# Patient Record
Sex: MALE | Race: WHITE | NOT HISPANIC OR LATINO | Employment: OTHER | URBAN - METROPOLITAN AREA
[De-identification: names, ages, dates, MRNs, and addresses within clinical notes are randomized per-mention and may not be internally consistent; named-entity substitution may affect disease eponyms.]

---

## 2017-01-04 RX ORDER — LISINOPRIL 10 MG/1
TABLET ORAL
Qty: 30 TABLET | Refills: 5 | Status: SHIPPED | OUTPATIENT
Start: 2017-01-04 | End: 2017-08-04 | Stop reason: SDUPTHER

## 2017-01-04 RX ORDER — CARVEDILOL 25 MG/1
TABLET ORAL
Qty: 60 TABLET | Refills: 5 | Status: SHIPPED | OUTPATIENT
Start: 2017-01-04 | End: 2017-08-04 | Stop reason: SDUPTHER

## 2017-01-04 RX ORDER — FUROSEMIDE 40 MG/1
TABLET ORAL
Qty: 60 TABLET | Refills: 5 | Status: SHIPPED | OUTPATIENT
Start: 2017-01-04 | End: 2017-12-18 | Stop reason: SDUPTHER

## 2017-01-06 ENCOUNTER — ANTICOAGULATION VISIT (OUTPATIENT)
Dept: CARDIOLOGY | Facility: CLINIC | Age: 47
End: 2017-01-06

## 2017-01-06 LAB — INR PPP: 2.56

## 2017-02-02 RX ORDER — TEMAZEPAM 15 MG/1
CAPSULE ORAL
Qty: 30 CAPSULE | Refills: 1 | OUTPATIENT
Start: 2017-02-02

## 2017-02-06 RX ORDER — TEMAZEPAM 15 MG/1
CAPSULE ORAL
Qty: 30 CAPSULE | Refills: 0 | OUTPATIENT
Start: 2017-02-06

## 2017-02-08 ENCOUNTER — ANTICOAGULATION VISIT (OUTPATIENT)
Dept: CARDIOLOGY | Facility: CLINIC | Age: 47
End: 2017-02-08

## 2017-02-08 LAB — INR PPP: 2.34

## 2017-02-15 ENCOUNTER — OFFICE VISIT (OUTPATIENT)
Dept: CARDIOLOGY | Facility: CLINIC | Age: 47
End: 2017-02-15

## 2017-02-15 VITALS
BODY MASS INDEX: 39.23 KG/M2 | WEIGHT: 280.2 LBS | SYSTOLIC BLOOD PRESSURE: 94 MMHG | HEART RATE: 64 BPM | DIASTOLIC BLOOD PRESSURE: 60 MMHG | HEIGHT: 71 IN

## 2017-02-15 DIAGNOSIS — I10 ESSENTIAL HYPERTENSION: ICD-10-CM

## 2017-02-15 DIAGNOSIS — I42.0 DILATED CARDIOMYOPATHY (HCC): ICD-10-CM

## 2017-02-15 DIAGNOSIS — I50.22 CHRONIC SYSTOLIC HEART FAILURE (HCC): Primary | ICD-10-CM

## 2017-02-15 DIAGNOSIS — I48.0 PAROXYSMAL ATRIAL FIBRILLATION (HCC): ICD-10-CM

## 2017-02-15 PROCEDURE — 93289 INTERROG DEVICE EVAL HEART: CPT | Performed by: INTERNAL MEDICINE

## 2017-02-15 PROCEDURE — 99213 OFFICE O/P EST LOW 20 MIN: CPT | Performed by: INTERNAL MEDICINE

## 2017-02-15 NOTE — PROGRESS NOTES
Vikas R Gobler  1970  [phone]      02/15/2017    [facility] CARDIOLOGY     Mandie Gandhi MD  [address]  [facility] [address]    Chief Complaint   Patient presents with   • Atrial Fibrillation   • Congestive Heart Failure       Problem List:   Problem   Atrial Fibrillation        a. Onset at age 32, initiated on flecainide and Coumadin therapy.   b. External cardioversion to normal sinus rhythm on 03/28/2007, maintaining normal sinus rhythm for approximately 2 weeks.   c. Left heart catheterization on 04/27/2007 with normal coronary arteries, EF 35%.   d. External cardioversion following TAMMIE showing no thrombus, 05/01/2007; maintaining normal sinus rhythm for approximately 2 months.  e. Echocardiogram on 04/15/2008 with mild TR, MR, mild LVH, EF 45% to 50%.  f. MUGA on 04/15/2008: EF 58%.   g. TAMMIE, 04/13/2009, with no thrombus, EF 50%.   h. External cardioversion to normal sinus rhythm, 04/13/2009.  i. Tambocor changed to Rythmol, 04/13/2009.  j. Pulmonary vein isolation procedure, 07/07/2009.   k. Recurrent episode of atrial fibrillation with RVR, status post external cardioversion, August 2009.  l. Repeat CT scan of the chest, October 2009, no evidence of pulmonary vein stenosis.   m. TAMMIE/external cardioversion, 07/01/2011: EF 30% to 35%, moderate LAE at 5.5 cm, mild to moderate spontaneous contrast, external cardioversion to normal sinus rhythm with a 200 joule shock with ERAF within 1 minute.   n. Initiation of Tikosyn with spontaneous conversion to normal sinus rhythm, 07/11/2011.   o. Repeat pulmonary vein isolation procedure, 01/30/2012.   p. Intracardiac ultrasound of the pulmonary veins for dyspnea, 06/04/2012, showing no significant pulmonary vein stenosis.  q. Pulmonary vein redo isolation of right inferior pulmonary vein, ablation of right atrial and left atrial rotors, left atrial posterior wall, left atrial anterior wall, and right atrial flutter,  12/03/2013      Dilated Cardiomyopathy/CHF Class III     r. Left heart catheterization, 06/04/2012, with ejection fraction of 38%, trace to mild mitral regurgitation, global hypokinesis, normal coronary arteries with LVEDP of 34 torr.  s. Echocardiogram, 10/24/2012, with an EF of 45% to 50%, moderate to severe LAE, concentric LVH.  t. Echocardiogram, 10/24/2012, left ventricular ejection fraction 45% to 50%, mild LVH.  u. Echocardiogram, 12/23/2013, left ventricular ejection fraction of 35%, small pericardial effusion.   v. MUGA, April 2014, left ventricular ejection fraction of approximately 39%.   w. Implantation of the Optimizer cardiac stimulator, 02/16/2015.   x. Echocardiogram, 12/18/2014, with evidence of ischemic cardiomyopathy, EF 40% to 45%.  y. MUGA, May 2015, left ventricular ejection fraction of 37% to 38%       Allergies  No Known Allergies    Current Medications    Current Outpatient Prescriptions:   •  ALPRAZolam (XANAX) 0.25 MG tablet, Take 1 tablet by mouth 3 (Three) Times a Day As Needed for anxiety for up to 30 doses. (Patient taking differently: Take 0.25 mg by mouth As Needed for anxiety.), Disp: 30 tablet, Rfl: 1  •  aspirin 81 MG EC tablet, Take 81 mg by mouth daily., Disp: , Rfl:   •  carvedilol (COREG) 25 MG tablet, TAKE ONE TABLET BY MOUTH TWO TIMES A DAY, Disp: 60 tablet, Rfl: 5  •  furosemide (LASIX) 40 MG tablet, TAKE ONE TABLET BY MOUTH TWO TIMES A DAY, Disp: 60 tablet, Rfl: 5  •  lisinopril (PRINIVIL,ZESTRIL) 10 MG tablet, TAKE ONE TABLET BY MOUTH ONCE DAILY, Disp: 30 tablet, Rfl: 5  •  metOLazone (ZAROXOLYN) 2.5 MG tablet, TAKE ONE TABLET BY MOUTH ONCE DAILY (Patient taking differently: TAKE ONE TABLET BY MOUTH PRN), Disp: 30 tablet, Rfl: 5  •  sertraline (ZOLOFT) 25 MG tablet, Take 1 tablet by mouth Every Night., Disp: 30 tablet, Rfl: 6  •  spironolactone (ALDACTONE) 25 MG tablet, TAKE ONE TABLET BY MOUTH ONCE DAILY, Disp: 30 tablet, Rfl: 11  •  temazepam (RESTORIL) 15 MG capsule,  "Take 1 capsule by mouth At Night As Needed for sleep for up to 30 doses., Disp: 30 capsule, Rfl: 0  •  warfarin (COUMADIN) 5 MG tablet, Take one and one half pill every evening or as directed.  Must have INR done before any more refills (Patient taking differently: Take  by mouth. Take one and one half pill every evening or as directed.  Must have INR done before any more refills   Pt reports taking 7.5 mg on Tuesday, Thursday, and Saturday. 5mg all other days), Disp: 45 tablet, Rfl: 0    History of Present Illness   HPI    Pt presents for follow up of AF/CHF. Since the pt has seen us, pt denies any sustained palpitations, SOB, CP, LH, and dizziness. Denies any hospitalizations, ER visits, ICD shocks, or TIA/CVA symptoms. Overall feels well. No side effects to medications. Has lost 35 lbs.     ROS:  General:  + fatigue, + weight loss  Cardiovascular:  Denies CP, PND, syncope, near syncope, edema or palpitations.  Pulmonary:  Denies ZAMUDIO, cough, or wheezing    Vitals:    02/15/17 1533   BP: 94/60   BP Location: Left arm   Patient Position: Sitting   Pulse: 64   Weight: 280 lb 3.2 oz (127 kg)   Height: 71\" (180.3 cm)       PE:  NAD  Neck: no JVD, no carotid bruits, no TM  Heart RRR, NL S1, S2, S4 present, no rubs, murmurs  Lungs: CTA  Abd: soft, non-tender, NL BS  Ext: No musculoskeletal deformities    Diagnostic Data:  Procedures.    1. Chronic systolic heart failure    2. Paroxysmal atrial fibrillation    3. Dilated cardiomyopathy    4. Essential hypertension        Sub Q ICD interrogation: NL fxn, NL battery fxn, No VT or AF, 80% nick fxn    Optimizer: NL fxn    Plan:  1) CHF Class I presently on appropriate meds: NL optimizer fxn  Continue present medications. ICD function normal.   2) Anticoagulation  Continue warfarin/ASA  3) PAF: no recurrence  Wt loss, exercise, salt reduction    F/up in 6 months         "

## 2017-03-21 ENCOUNTER — ANTICOAGULATION VISIT (OUTPATIENT)
Dept: CARDIOLOGY | Facility: CLINIC | Age: 47
End: 2017-03-21

## 2017-03-21 LAB — INR PPP: 2.59

## 2017-04-05 RX ORDER — WARFARIN SODIUM 5 MG/1
TABLET ORAL
Qty: 45 TABLET | Refills: 2 | Status: SHIPPED | OUTPATIENT
Start: 2017-04-05 | End: 2017-08-29

## 2017-05-05 ENCOUNTER — ANTICOAGULATION VISIT (OUTPATIENT)
Dept: CARDIOLOGY | Facility: CLINIC | Age: 47
End: 2017-05-05

## 2017-05-05 LAB — INR PPP: 1.91

## 2017-05-30 ENCOUNTER — CLINICAL SUPPORT NO REQUIREMENTS (OUTPATIENT)
Dept: CARDIOLOGY | Facility: CLINIC | Age: 47
End: 2017-05-30

## 2017-05-30 DIAGNOSIS — I42.0 DILATED CARDIOMYOPATHY (HCC): Primary | ICD-10-CM

## 2017-05-30 PROCEDURE — 93296 REM INTERROG EVL PM/IDS: CPT | Performed by: INTERNAL MEDICINE

## 2017-05-30 PROCEDURE — 93295 DEV INTERROG REMOTE 1/2/MLT: CPT | Performed by: INTERNAL MEDICINE

## 2017-05-31 ENCOUNTER — ANTICOAGULATION VISIT (OUTPATIENT)
Dept: CARDIOLOGY | Facility: CLINIC | Age: 47
End: 2017-05-31

## 2017-05-31 LAB — INR PPP: 1.82

## 2017-06-20 ENCOUNTER — ANTICOAGULATION VISIT (OUTPATIENT)
Dept: CARDIOLOGY | Facility: CLINIC | Age: 47
End: 2017-06-20

## 2017-06-20 LAB — INR PPP: 1.76

## 2017-06-20 NOTE — PATIENT INSTRUCTIONS
Patient given new dosage and to recheck 1 week.  Says that he has missed no doses or change in diet.    AH

## 2017-06-28 ENCOUNTER — ANTICOAGULATION VISIT (OUTPATIENT)
Dept: CARDIOLOGY | Facility: CLINIC | Age: 47
End: 2017-06-28

## 2017-06-28 LAB — INR PPP: 2.45

## 2017-07-07 ENCOUNTER — ANTICOAGULATION VISIT (OUTPATIENT)
Dept: CARDIOLOGY | Facility: CLINIC | Age: 47
End: 2017-07-07

## 2017-07-07 DIAGNOSIS — I48.92 ATRIAL FLUTTER, UNSPECIFIED TYPE (HCC): ICD-10-CM

## 2017-07-07 LAB — INR PPP: 2.24

## 2017-07-21 ENCOUNTER — ANTICOAGULATION VISIT (OUTPATIENT)
Dept: CARDIOLOGY | Facility: CLINIC | Age: 47
End: 2017-07-21

## 2017-07-21 LAB — INR PPP: 2.21

## 2017-08-04 RX ORDER — LISINOPRIL 10 MG/1
TABLET ORAL
Qty: 30 TABLET | Refills: 6 | Status: SHIPPED | OUTPATIENT
Start: 2017-08-04 | End: 2018-01-01

## 2017-08-04 RX ORDER — SPIRONOLACTONE 25 MG/1
TABLET ORAL
Qty: 30 TABLET | Refills: 11 | Status: SHIPPED | OUTPATIENT
Start: 2017-08-04 | End: 2018-01-01 | Stop reason: ALTCHOICE

## 2017-08-04 RX ORDER — CARVEDILOL 25 MG/1
TABLET ORAL
Qty: 60 TABLET | Refills: 6 | Status: SHIPPED | OUTPATIENT
Start: 2017-08-04 | End: 2018-01-01 | Stop reason: SDUPTHER

## 2017-08-04 RX ORDER — WARFARIN SODIUM 5 MG/1
TABLET ORAL
Qty: 45 TABLET | Refills: 3 | Status: SHIPPED | OUTPATIENT
Start: 2017-08-04 | End: 2017-08-29

## 2017-08-22 ENCOUNTER — ANTICOAGULATION VISIT (OUTPATIENT)
Dept: CARDIOLOGY | Facility: CLINIC | Age: 47
End: 2017-08-22

## 2017-08-22 LAB — INR PPP: 1.96

## 2017-08-22 NOTE — PATIENT INSTRUCTIONS
Denies any change in meds or diet.  Has been on the same dose for several months so will not change his dose but if still down next time may have to increase his dose.   AH

## 2017-08-23 ENCOUNTER — OFFICE VISIT (OUTPATIENT)
Dept: CARDIOLOGY | Facility: CLINIC | Age: 47
End: 2017-08-23

## 2017-08-23 VITALS
HEART RATE: 65 BPM | DIASTOLIC BLOOD PRESSURE: 60 MMHG | SYSTOLIC BLOOD PRESSURE: 110 MMHG | BODY MASS INDEX: 39.98 KG/M2 | HEIGHT: 71 IN | WEIGHT: 285.6 LBS

## 2017-08-23 DIAGNOSIS — I10 ESSENTIAL HYPERTENSION: ICD-10-CM

## 2017-08-23 DIAGNOSIS — I50.22 CHRONIC SYSTOLIC HEART FAILURE (HCC): Primary | ICD-10-CM

## 2017-08-23 DIAGNOSIS — I48.0 PAROXYSMAL ATRIAL FIBRILLATION (HCC): ICD-10-CM

## 2017-08-23 DIAGNOSIS — I42.0 DILATED CARDIOMYOPATHY (HCC): ICD-10-CM

## 2017-08-23 PROCEDURE — 99213 OFFICE O/P EST LOW 20 MIN: CPT | Performed by: INTERNAL MEDICINE

## 2017-08-23 PROCEDURE — 93282 PRGRMG EVAL IMPLANTABLE DFB: CPT | Performed by: INTERNAL MEDICINE

## 2017-08-23 NOTE — PROGRESS NOTES
Vikas R Longwood  1970  [phone]      08/23/2017    [facility] CARDIOLOGY     Mandie Gandhi MD  [address]  [facility] [address]    Chief Complaint   Patient presents with   • Atrial Fibrillation   • Cardiomyopathy       Problem List:   Atrial Fibrillation          a. Onset at age 32, initiated on flecainide and Coumadin therapy.   b. External cardioversion to normal sinus rhythm on 03/28/2007, maintaining normal sinus rhythm for approximately 2 weeks.   c. Left heart catheterization on 04/27/2007 with normal coronary arteries, EF 35%.   d. External cardioversion following TAMMIE showing no thrombus, 05/01/2007; maintaining normal sinus rhythm for approximately 2 months.  e. Echocardiogram on 04/15/2008 with mild TR, MR, mild LVH, EF 45% to 50%.  f. MUGA on 04/15/2008: EF 58%.   g. TAMMIE, 04/13/2009, with no thrombus, EF 50%.   h. External cardioversion to normal sinus rhythm, 04/13/2009.  i. Tambocor changed to Rythmol, 04/13/2009.  j. Pulmonary vein isolation procedure, 07/07/2009.   k. Recurrent episode of atrial fibrillation with RVR, status post external cardioversion, August 2009.  l. Repeat CT scan of the chest, October 2009, no evidence of pulmonary vein stenosis.   m. TAMMIE/external cardioversion, 07/01/2011: EF 30% to 35%, moderate LAE at 5.5 cm, mild to moderate spontaneous contrast, external cardioversion to normal sinus rhythm with a 200 joule shock with ERAF within 1 minute.   n. Initiation of Tikosyn with spontaneous conversion to normal sinus rhythm, 07/11/2011.   o. Repeat pulmonary vein isolation procedure, 01/30/2012.   p. Intracardiac ultrasound of the pulmonary veins for dyspnea, 06/04/2012, showing no significant pulmonary vein stenosis.  q. Pulmonary vein redo isolation of right inferior pulmonary vein, ablation of right atrial and left atrial rotors, left atrial posterior wall, left atrial anterior wall, and right atrial flutter, 12/03/2013        Dilated Cardiomyopathy/CHF Class III     r. Left heart catheterization, 06/04/2012, with ejection fraction of 38%, trace to mild mitral regurgitation, global hypokinesis, normal coronary arteries with LVEDP of 34 torr.  s. Echocardiogram, 10/24/2012, with an EF of 45% to 50%, moderate to severe LAE, concentric LVH.  t. Echocardiogram, 10/24/2012, left ventricular ejection fraction 45% to 50%, mild LVH.  u. Echocardiogram, 12/23/2013, left ventricular ejection fraction of 35%, small pericardial effusion.   v. MUGA, April 2014, left ventricular ejection fraction of approximately 39%.   w. Implantation of the Optimizer cardiac stimulator, 02/16/2015.   x. Echocardiogram, 12/18/2014, with evidence of ischemic cardiomyopathy, EF 40% to 45%.  y. MUGA, May 2015, left ventricular ejection fraction of 37% to 38%         Allergies  No Known Allergies    Current Medications    Current Outpatient Prescriptions:   •  aspirin 81 MG EC tablet, Take 81 mg by mouth daily., Disp: , Rfl:   •  carvedilol (COREG) 25 MG tablet, TAKE ONE TABLET BY MOUTH TWO TIMES A DAY, Disp: 60 tablet, Rfl: 6  •  furosemide (LASIX) 40 MG tablet, TAKE ONE TABLET BY MOUTH TWO TIMES A DAY, Disp: 60 tablet, Rfl: 5  •  lisinopril (PRINIVIL,ZESTRIL) 10 MG tablet, TAKE ONE TABLET BY MOUTH ONCE DAILY, Disp: 30 tablet, Rfl: 6  •  metOLazone (ZAROXOLYN) 2.5 MG tablet, TAKE ONE TABLET BY MOUTH ONCE DAILY (Patient taking differently: TAKE ONE TABLET BY MOUTH PRN), Disp: 30 tablet, Rfl: 5  •  spironolactone (ALDACTONE) 25 MG tablet, TAKE ONE TABLET BY MOUTH ONCE DAILY, Disp: 30 tablet, Rfl: 11  •  temazepam (RESTORIL) 15 MG capsule, Take 1 capsule by mouth At Night As Needed for sleep for up to 30 doses., Disp: 30 capsule, Rfl: 0  •  warfarin (COUMADIN) 5 MG tablet, Take one and one half pill every evening or as directed.  Must have INR done before any more refills (Patient taking differently: Take  by mouth. Take one and one half pill every evening or as directed.  " Must have INR done before any more refills   Pt reports taking 7.5 mg on Tuesday, Thursday, and Saturday. 5mg all other days), Disp: 45 tablet, Rfl: 0  •  warfarin (COUMADIN) 5 MG tablet, TAKE 1 AND 1/2 TABLETS BY MOUTH IN THE EVENING, Disp: 45 tablet, Rfl: 2  •  warfarin (COUMADIN) 5 MG tablet, TAKE 1 AND 1/2 TABLETS BY MOUTH IN THE EVENING, Disp: 45 tablet, Rfl: 3    History of Present Illness   HPI    Pt presents for follow up of cardiomyopathy, ICD check, and atrial fibrillation. Since we last saw the pt, pt denies any sustained episodes of AF episodes. He has good days and bad days. Some days he has some crackling and wheezing with SOB. He takes extra lasix when this happens. Otherwise he denies CP, LH, and dizziness. Denies any hospitalizations, ER visits, bleeding, or TIA/CVA symptoms. Overall feels well.    ROS:  General:  Denies fatigue, weight gain or loss  Cardiovascular:  Denies CP, PND, syncope, near syncope, edema or palpitations.  Pulmonary:  Denies ZAMUDIO, cough, or wheezing      Vitals:    08/23/17 1508   BP: 110/60   BP Location: Left arm   Patient Position: Sitting   Pulse: 65   Weight: 285 lb 9.6 oz (130 kg)   Height: 71\" (180.3 cm)     PE:  General: NAD  Neck: no JVD, no carotid bruits, no TM  Heart RRR, NL S1, S2, S4 present, no rubs, murmurs  Lungs: CTA, no wheezes, rhonchi, or rales  Abd: soft, non-tender, NL BS  Ext: No musculoskeletal deformities, no edema, cyanosis, or clubbing  Psych: normal mood and affect    Diagnostic Data:      Procedures    1. Chronic systolic heart failure    2. Paroxysmal atrial fibrillation    3. Dilated cardiomyopathy    4. Essential hypertension          SQ ICD: NL fxn no VT/AF    Optimizer: NL fxn    Plan:  1) CHF Class I presently on appropriate meds: NL optimizer fxn: check MUGA  Continue present medications. ICD function normal.   2) Anticoagulation  Continue warfarin/ASA  3) PAF: no recurrence  Wt loss, exercise, salt reduction  4) HTN: Stable    F/up in 6 " months

## 2017-08-29 ENCOUNTER — TELEPHONE (OUTPATIENT)
Dept: CARDIOLOGY | Facility: CLINIC | Age: 47
End: 2017-08-29

## 2017-08-29 NOTE — TELEPHONE ENCOUNTER
[facility] pharmacy called [phone] and stated that the pt had brought in Eliquis copay cards but no Rx has been called in. According to last note on 8/23/2017 pt was to continue Warfarin. I called pt at home no answer and no machine. Called cell and left a message for the pt to call us back.

## 2017-09-01 DIAGNOSIS — I42.0 DILATED CARDIOMYOPATHY (HCC): Primary | ICD-10-CM

## 2017-09-11 ENCOUNTER — HOSPITAL ENCOUNTER (OUTPATIENT)
Dept: CARDIOLOGY | Facility: HOSPITAL | Age: 47
Discharge: HOME | End: 2017-09-11
Attending: INTERNAL MEDICINE | Admitting: INTERNAL MEDICINE

## 2017-09-11 DIAGNOSIS — I42.0 DILATED CARDIOMYOPATHY (HCC): ICD-10-CM

## 2017-09-11 LAB
MAXIMAL PREDICTED HEART RATE: 176 BPM
STRESS TARGET HR: 150 BPM

## 2017-09-11 PROCEDURE — 0 TECHNETIUM LABELED RED BLOOD CELLS: Performed by: INTERNAL MEDICINE

## 2017-09-11 PROCEDURE — A9560 TC99M LABELED RBC: HCPCS | Performed by: INTERNAL MEDICINE

## 2017-09-11 PROCEDURE — 78472 GATED HEART PLANAR SINGLE: CPT

## 2017-09-11 PROCEDURE — 78472 GATED HEART PLANAR SINGLE: CPT | Performed by: INTERNAL MEDICINE

## 2017-09-11 RX ADMIN — TECHNETIUM TC 99M-LABELED RED BLOOD CELLS 1 DOSE: KIT at 09:07

## 2017-10-27 ENCOUNTER — HOSPITAL ENCOUNTER (OUTPATIENT)
Dept: GENERAL RADIOLOGY | Facility: HOSPITAL | Age: 47
Discharge: HOME | End: 2017-10-27
Admitting: NURSE PRACTITIONER

## 2017-10-27 ENCOUNTER — TELEPHONE (OUTPATIENT)
Dept: CARDIOLOGY | Facility: CLINIC | Age: 47
End: 2017-10-27

## 2017-10-27 ENCOUNTER — OFFICE VISIT (OUTPATIENT)
Dept: CARDIOLOGY | Facility: HOSPITAL | Age: 47
End: 2017-10-27

## 2017-10-27 VITALS
OXYGEN SATURATION: 93 % | HEART RATE: 71 BPM | WEIGHT: 290 LBS | RESPIRATION RATE: 23 BRPM | SYSTOLIC BLOOD PRESSURE: 98 MMHG | HEIGHT: 71 IN | DIASTOLIC BLOOD PRESSURE: 64 MMHG | TEMPERATURE: 97 F | BODY MASS INDEX: 40.6 KG/M2

## 2017-10-27 DIAGNOSIS — I50.21 ACUTE SYSTOLIC CONGESTIVE HEART FAILURE (HCC): Primary | ICD-10-CM

## 2017-10-27 DIAGNOSIS — R53.83 OTHER FATIGUE: ICD-10-CM

## 2017-10-27 DIAGNOSIS — I48.0 PAROXYSMAL ATRIAL FIBRILLATION (HCC): ICD-10-CM

## 2017-10-27 DIAGNOSIS — I42.0 DILATED CARDIOMYOPATHY (HCC): ICD-10-CM

## 2017-10-27 LAB
ANION GAP SERPL CALCULATED.3IONS-SCNC: 14 MMOL/L (ref 3–11)
BASOPHILS # BLD AUTO: 0.03 10*3/MM3 (ref 0–0.2)
BASOPHILS NFR BLD AUTO: 0.3 % (ref 0–1)
BNP SERPL-MCNC: 391 PG/ML (ref 0–100)
BUN BLD-MCNC: 21 MG/DL (ref 9–23)
BUN/CREAT SERPL: 17.5 (ref 7–25)
CALCIUM SPEC-SCNC: 9.6 MG/DL (ref 8.7–10.4)
CHLORIDE SERPL-SCNC: 96 MMOL/L (ref 99–109)
CO2 SERPL-SCNC: 23 MMOL/L (ref 20–31)
CREAT BLD-MCNC: 1.2 MG/DL (ref 0.6–1.3)
DEPRECATED RDW RBC AUTO: 46.9 FL (ref 37–54)
EOSINOPHIL # BLD AUTO: 0.26 10*3/MM3 (ref 0–0.3)
EOSINOPHIL NFR BLD AUTO: 2.8 % (ref 0–3)
ERYTHROCYTE [DISTWIDTH] IN BLOOD BY AUTOMATED COUNT: 13.3 % (ref 11.3–14.5)
GFR SERPL CREATININE-BSD FRML MDRD: 65 ML/MIN/1.73
GLUCOSE BLD-MCNC: 123 MG/DL (ref 70–100)
HCT VFR BLD AUTO: 44.8 % (ref 38.9–50.9)
HGB BLD-MCNC: 15.7 G/DL (ref 13.1–17.5)
IMM GRANULOCYTES # BLD: 0.01 10*3/MM3 (ref 0–0.03)
IMM GRANULOCYTES NFR BLD: 0.1 % (ref 0–0.6)
LYMPHOCYTES # BLD AUTO: 0.96 10*3/MM3 (ref 0.6–4.8)
LYMPHOCYTES NFR BLD AUTO: 10.5 % (ref 24–44)
MAGNESIUM SERPL-MCNC: 2.2 MG/DL (ref 1.3–2.7)
MCH RBC QN AUTO: 33.6 PG (ref 27–31)
MCHC RBC AUTO-ENTMCNC: 35 G/DL (ref 32–36)
MCV RBC AUTO: 95.9 FL (ref 80–99)
MONOCYTES # BLD AUTO: 0.86 10*3/MM3 (ref 0–1)
MONOCYTES NFR BLD AUTO: 9.4 % (ref 0–12)
NEUTROPHILS # BLD AUTO: 7.01 10*3/MM3 (ref 1.5–8.3)
NEUTROPHILS NFR BLD AUTO: 76.9 % (ref 41–71)
PLATELET # BLD AUTO: 177 10*3/MM3 (ref 150–450)
PMV BLD AUTO: 11.5 FL (ref 6–12)
POTASSIUM BLD-SCNC: 4.1 MMOL/L (ref 3.5–5.5)
RBC # BLD AUTO: 4.67 10*6/MM3 (ref 4.2–5.76)
SODIUM BLD-SCNC: 133 MMOL/L (ref 132–146)
WBC NRBC COR # BLD: 9.13 10*3/MM3 (ref 3.5–10.8)

## 2017-10-27 PROCEDURE — 80048 BASIC METABOLIC PNL TOTAL CA: CPT | Performed by: NURSE PRACTITIONER

## 2017-10-27 PROCEDURE — 99215 OFFICE O/P EST HI 40 MIN: CPT | Performed by: NURSE PRACTITIONER

## 2017-10-27 PROCEDURE — 85025 COMPLETE CBC W/AUTO DIFF WBC: CPT | Performed by: NURSE PRACTITIONER

## 2017-10-27 PROCEDURE — 83735 ASSAY OF MAGNESIUM: CPT | Performed by: NURSE PRACTITIONER

## 2017-10-27 PROCEDURE — 83880 ASSAY OF NATRIURETIC PEPTIDE: CPT | Performed by: NURSE PRACTITIONER

## 2017-10-27 PROCEDURE — 71020 HC CHEST PA AND LATERAL: CPT

## 2017-10-27 RX ORDER — GUAIFENESIN AND CODEINE PHOSPHATE 100; 10 MG/5ML; MG/5ML
5 SOLUTION ORAL 3 TIMES DAILY PRN
COMMUNITY
End: 2017-11-04

## 2017-10-27 RX ORDER — FUROSEMIDE 10 MG/ML
80 INJECTION INTRAMUSCULAR; INTRAVENOUS ONCE
Status: DISCONTINUED | OUTPATIENT
Start: 2017-10-27 | End: 2017-12-18

## 2017-10-27 RX ORDER — ASPIRIN 81 MG/1
81 TABLET, CHEWABLE ORAL EVERY EVENING
COMMUNITY
End: 2018-01-01

## 2017-10-27 RX ORDER — AMOXICILLIN AND CLAVULANATE POTASSIUM 875; 125 MG/1; MG/1
1 TABLET, FILM COATED ORAL 2 TIMES DAILY
COMMUNITY
Start: 2017-10-23 | End: 2017-11-04

## 2017-10-27 NOTE — TELEPHONE ENCOUNTER
Patient is calling today c/o a cough and swelling in his legs, feet and ankles. He has gained 6 pounds over the last three days. He has not been sleeping. He doubled his dose of Lasix over the last three days and it still has not helped. He is going to see the HF clinic today at 2:30.

## 2017-10-27 NOTE — PROGRESS NOTES
Encounter Date:10/27/2017      Patient ID: Vikas Deleon is a 47 y.o. male.        Subjective:     Chief Complaint: Follow-up (Swelling and Rapid Weight Gain)     History of Present Illness   [facility] for acute CHF. He says he has gained 6lbs in the past 3 days. He says a week ago he had an acute URI which was treated with antibiotics. He started having worsening edema and worsening cough over the past couple of days. He has not slept in 48 hours because he cannot sit down without coughing or getting short of breath. He took one metolazone this morning in addition to his lasix.     Patient Active Problem List   Diagnosis   • Atrial fibrillation   • Atrial flutter   • Systolic heart failure   • Dilated cardiomyopathy   • Atypical chest pain   • COPD (chronic obstructive pulmonary disease)   • KUMAR (obstructive sleep apnea)   • Hypertension   • Chest pain   • ICD (implantable cardioverter-defibrillator) malfunction         Past Surgical History:   Procedure Laterality Date   • CARDIAC DEFIBRILLATOR PLACEMENT      optimizer as well   • CARDIAC ELECTROPHYSIOLOGY PROCEDURE N/A 11/3/2016    Procedure: Lead Repair;  Surgeon: Rene Tamayo MD;  Location: [facility] INVASIVE LOCATION;  Service:    • CARDIAC SURGERY      cardioversion - 14   ablations- 3   heart cath- 2   • TRANSESOPHAGEAL ECHOCARDIOGRAM (TAMMIE) AND CARDIOVERSION     • VASECTOMY     • WISDOM TOOTH EXTRACTION         No Known Allergies      Current Outpatient Prescriptions:   •  apixaban (ELIQUIS) 5 MG tablet tablet, Take 1 tablet by mouth 2 (Two) Times a Day., Disp: 60 tablet, Rfl: 11  •  aspirin 81 MG chewable tablet, Chew 81 mg Every Evening., Disp: , Rfl:   •  carvedilol (COREG) 25 MG tablet, TAKE ONE TABLET BY MOUTH TWO TIMES A DAY, Disp: 60 tablet, Rfl: 6  •  furosemide (LASIX) 40 MG tablet, TAKE ONE TABLET BY MOUTH TWO TIMES A DAY, Disp: 60 tablet, Rfl: 5  •  guaifenesin-codeine  (GUAIFENESIN AC) 100-10 MG/5ML liquid, Take 5 mL by mouth 3 (Three) Times a Day As Needed for Cough., Disp: , Rfl:   •  lisinopril (PRINIVIL,ZESTRIL) 10 MG tablet, TAKE ONE TABLET BY MOUTH ONCE DAILY, Disp: 30 tablet, Rfl: 6  •  metOLazone (ZAROXOLYN) 2.5 MG tablet, TAKE ONE TABLET BY MOUTH ONCE DAILY (Patient taking differently: TAKE ONE TABLET BY MOUTH PRN), Disp: 30 tablet, Rfl: 5  •  spironolactone (ALDACTONE) 25 MG tablet, TAKE ONE TABLET BY MOUTH ONCE DAILY, Disp: 30 tablet, Rfl: 11  •  amoxicillin-clavulanate (AUGMENTIN) 875-125 MG per tablet, 1 tablet 2 (Two) Times a Day., Disp: , Rfl:     Current Facility-Administered Medications:   •  furosemide (LASIX) injection 80 mg, 80 mg, Intravenous, Once, Jossie Aguayo, RONNIE    The following portions of the chart were reviewed and updated as appropriate: Allergies, current medications, past family history, social history, past medical history.     Review of Systems   Constitution: Positive for malaise/fatigue and weight gain (6 lbs overnight). Negative for chills, decreased appetite, diaphoresis, fever, weakness and weight loss.   HENT: Negative for congestion, hearing loss, hoarse voice and nosebleeds.    Eyes: Negative for blurred vision, visual disturbance and visual halos.   Cardiovascular: Positive for claudication, dyspnea on exertion, leg swelling, orthopnea and palpitations. Negative for chest pain, cyanosis, irregular heartbeat, near-syncope, paroxysmal nocturnal dyspnea and syncope.   Respiratory: Positive for cough (dry), shortness of breath, sleep disturbances due to breathing and wheezing. Negative for hemoptysis, snoring and sputum production.    Hematologic/Lymphatic: Negative for bleeding problem. Does not bruise/bleed easily.   Skin: Negative for dry skin, itching and rash.   Musculoskeletal: Positive for joint pain. Negative for arthritis, joint swelling and myalgias.   Gastrointestinal: Negative for bloating, abdominal pain, constipation,  "diarrhea, flatus, heartburn, hematemesis, hematochezia, melena, nausea and vomiting.   Genitourinary: Negative for dysuria, frequency, hematuria, nocturia and urgency.   Neurological: Positive for dizziness, light-headedness and loss of balance. Negative for excessive daytime sleepiness.   Psychiatric/Behavioral: Negative for depression. The patient does not have insomnia and is not nervous/anxious.            Objective:     Vitals:    10/27/17 1453 10/27/17 1454 10/27/17 1455   BP: 96/66 105/64 98/64   BP Location: Right arm Left arm Left arm   Patient Position: Sitting Sitting Standing   Cuff Size: Adult     Pulse: 68 70 71   Resp: 23     Temp: 97 °F (36.1 °C)     TempSrc: Temporal Artery      SpO2: 93%     Weight: 290 lb (132 kg)     Height: 71\" (180.3 cm)           Physical Exam   Constitutional: He is oriented to person, place, and time. He appears well-developed and well-nourished. He is active and cooperative. No distress.   HENT:   Head: Normocephalic and atraumatic.   Mouth/Throat: Oropharynx is clear and moist.   Eyes: Conjunctivae and EOM are normal. Pupils are equal, round, and reactive to light.   Neck: Normal range of motion. Neck supple. No JVD present. No tracheal deviation present. No thyromegaly present.   Cardiovascular: Normal rate, regular rhythm, normal heart sounds and intact distal pulses.    Pulmonary/Chest: Effort normal. He has rales.   Abdominal: Soft. Bowel sounds are normal. He exhibits distension. There is tenderness.   Musculoskeletal: Normal range of motion. He exhibits edema (2+ edema bilaterally).   Neurological: He is alert and oriented to person, place, and time.   Skin: Skin is warm, dry and intact.   Psychiatric: He has a normal mood and affect. His behavior is normal.       Lab and Diagnostic Review:    Reviewed last labs in June 2017 from PCP  Results for orders placed or performed in visit on 10/27/17   Basic Metabolic Panel   Result Value Ref Range    Glucose 123 (H) 70 - " 100 mg/dL    BUN 21 9 - 23 mg/dL    Creatinine 1.20 0.60 - 1.30 mg/dL    Sodium 133 132 - 146 mmol/L    Potassium 4.1 3.5 - 5.5 mmol/L    Chloride 96 (L) 99 - 109 mmol/L    CO2 23.0 20.0 - 31.0 mmol/L    Calcium 9.6 8.7 - 10.4 mg/dL    eGFR Non African Amer 65 >60 mL/min/1.73    BUN/Creatinine Ratio 17.5 7.0 - 25.0    Anion Gap 14.0 (H) 3.0 - 11.0 mmol/L   BNP   Result Value Ref Range    .0 (H) 0.0 - 100.0 pg/mL   Magnesium   Result Value Ref Range    Magnesium 2.2 1.3 - 2.7 mg/dL   CBC Auto Differential   Result Value Ref Range    WBC 9.13 3.50 - 10.80 10*3/mm3    RBC 4.67 4.20 - 5.76 10*6/mm3    Hemoglobin 15.7 13.1 - 17.5 g/dL    Hematocrit 44.8 38.9 - 50.9 %    MCV 95.9 80.0 - 99.0 fL    MCH 33.6 (H) 27.0 - 31.0 pg    MCHC 35.0 32.0 - 36.0 g/dL    RDW 13.3 11.3 - 14.5 %    RDW-SD 46.9 37.0 - 54.0 fl    MPV 11.5 6.0 - 12.0 fL    Platelets 177 150 - 450 10*3/mm3    Neutrophil % 76.9 (H) 41.0 - 71.0 %    Lymphocyte % 10.5 (L) 24.0 - 44.0 %    Monocyte % 9.4 0.0 - 12.0 %    Eosinophil % 2.8 0.0 - 3.0 %    Basophil % 0.3 0.0 - 1.0 %    Immature Grans % 0.1 0.0 - 0.6 %    Neutrophils, Absolute 7.01 1.50 - 8.30 10*3/mm3    Lymphocytes, Absolute 0.96 0.60 - 4.80 10*3/mm3    Monocytes, Absolute 0.86 0.00 - 1.00 10*3/mm3    Eosinophils, Absolute 0.26 0.00 - 0.30 10*3/mm3    Basophils, Absolute 0.03 0.00 - 0.20 10*3/mm3    Immature Grans, Absolute 0.01 0.00 - 0.03 10*3/mm3   cxr:   PA and lateral chest reveals the heart to be enlarged. Cardiac  pacer leads are in satisfactory position. No definite pleural effusion  or pneumothorax. Pulmonary vascularity is within normal limits.  Degenerative change is seen within the spi  Assessment and Plan:         1. Acute systolic congestive heart failure  - Basic Metabolic Panel  - BNP  - Magnesium  - XR Chest PA & Lateral  - IV diuresis today in office. Patient received 80 mg lasix (NDC 9580-7232-97) today through a butterfly in the left hand over slow IV push. During IV  diuresis, vitals were monitored and stable. Please see IV diuresis record for those vitals. Patient voided 625 ml in the office prior to discharge from the office. IV was d/c'd and area was free of erythema, ecchymosis, or drainage.  Patient was  instructed to record urinary output for the next 24 hours. Patient will receive a follow up call from the HF center in 24 hours to evaluate urinary output and reassess signs and symptoms.       2. Dilated cardiomyopathy  - Recent MUGA showed LVEF of 34%  - s/p ICD and cardiac optimizer    3. Paroxysmal atrial fibrillation  -s/p multiple PVAs. On Eliquis      It has been a pleasure to participate in the care of this patient.  Patient was instructed to call the [facility] with any questions, concerns, or worsening symptoms.    * Please note that portions of this note were completed with a voice recognition program. Efforts were made to edit the dictation but occasionally words are transcribed.

## 2017-10-30 ENCOUNTER — DOCUMENTATION (OUTPATIENT)
Dept: CARDIOLOGY | Facility: HOSPITAL | Age: 47
End: 2017-10-30

## 2017-10-30 NOTE — PROGRESS NOTES
Follow up call to patient after IV diuresis on Friday 10/27/17. Patient reports urine output of 2.5 liters and a 12 lb weight loss. Patient to continue lasix as directed. Patient instructed to call office with any questions, concerns or change in symptoms.

## 2017-11-02 ENCOUNTER — TELEPHONE (OUTPATIENT)
Dept: CARDIOLOGY | Facility: CLINIC | Age: 47
End: 2017-11-02

## 2017-11-02 NOTE — TELEPHONE ENCOUNTER
LM for patient to call me about his Warfarin and is someone else is regulating his dose or has he not gotten check since August.

## 2017-11-02 NOTE — TELEPHONE ENCOUNTER
Patient called back. Says he is no longer taking warfarin and has been taking Eliquis for at least 2 months now per Dr. Tamayo's orders.

## 2017-12-04 ENCOUNTER — TELEPHONE (OUTPATIENT)
Dept: OTHER | Facility: OTHER | Age: 47
End: 2017-12-04

## 2017-12-04 NOTE — TELEPHONE ENCOUNTER
I spoke with patient regarding his Optimizer device. He used a magnet to disable it on Thurs. Nov. 23rd due to increased arm movement. He will return to the [place] office for further evaluation of the device on Dec.13th by Grayson Kaminski.He states he is feeling well with no complaint.

## 2017-12-07 ENCOUNTER — CLINICAL SUPPORT NO REQUIREMENTS (OUTPATIENT)
Dept: CARDIOLOGY | Facility: CLINIC | Age: 47
End: 2017-12-07

## 2017-12-07 DIAGNOSIS — I42.0 DILATED CARDIOMYOPATHY (HCC): Primary | ICD-10-CM

## 2017-12-07 PROCEDURE — 93295 DEV INTERROG REMOTE 1/2/MLT: CPT | Performed by: INTERNAL MEDICINE

## 2017-12-07 PROCEDURE — 93296 REM INTERROG EVL PM/IDS: CPT | Performed by: INTERNAL MEDICINE

## 2017-12-18 RX ORDER — FUROSEMIDE 40 MG/1
40 TABLET ORAL 2 TIMES DAILY
Qty: 60 TABLET | Refills: 5 | Status: SHIPPED | OUTPATIENT
Start: 2017-12-18 | End: 2018-01-01 | Stop reason: SDUPTHER

## 2018-01-01 ENCOUNTER — OFFICE VISIT (OUTPATIENT)
Dept: CARDIOLOGY | Facility: CLINIC | Age: 48
End: 2018-01-01

## 2018-01-01 ENCOUNTER — TELEPHONE (OUTPATIENT)
Dept: CARDIOLOGY | Facility: CLINIC | Age: 48
End: 2018-01-01

## 2018-01-01 ENCOUNTER — RESULTS ENCOUNTER (OUTPATIENT)
Dept: CARDIOLOGY | Facility: CLINIC | Age: 48
End: 2018-01-01

## 2018-01-01 ENCOUNTER — CLINICAL SUPPORT NO REQUIREMENTS (OUTPATIENT)
Dept: CARDIOLOGY | Facility: CLINIC | Age: 48
End: 2018-01-01

## 2018-01-01 VITALS
BODY MASS INDEX: 40.09 KG/M2 | HEIGHT: 70 IN | SYSTOLIC BLOOD PRESSURE: 90 MMHG | HEART RATE: 96 BPM | WEIGHT: 280 LBS | DIASTOLIC BLOOD PRESSURE: 60 MMHG | OXYGEN SATURATION: 98 %

## 2018-01-01 VITALS
SYSTOLIC BLOOD PRESSURE: 92 MMHG | HEART RATE: 69 BPM | DIASTOLIC BLOOD PRESSURE: 60 MMHG | WEIGHT: 297 LBS | HEIGHT: 71 IN | BODY MASS INDEX: 41.58 KG/M2

## 2018-01-01 DIAGNOSIS — I42.0 DILATED CARDIOMYOPATHY (HCC): ICD-10-CM

## 2018-01-01 DIAGNOSIS — I50.22 CHRONIC SYSTOLIC CONGESTIVE HEART FAILURE (HCC): ICD-10-CM

## 2018-01-01 DIAGNOSIS — I50.22 CHRONIC SYSTOLIC HEART FAILURE (HCC): ICD-10-CM

## 2018-01-01 DIAGNOSIS — I42.0 DILATED CARDIOMYOPATHY (HCC): Primary | ICD-10-CM

## 2018-01-01 DIAGNOSIS — I48.0 PAROXYSMAL ATRIAL FIBRILLATION (HCC): Primary | ICD-10-CM

## 2018-01-01 DIAGNOSIS — I10 ESSENTIAL HYPERTENSION: ICD-10-CM

## 2018-01-01 PROCEDURE — 99213 OFFICE O/P EST LOW 20 MIN: CPT | Performed by: INTERNAL MEDICINE

## 2018-01-01 PROCEDURE — 93296 REM INTERROG EVL PM/IDS: CPT | Performed by: INTERNAL MEDICINE

## 2018-01-01 PROCEDURE — 93260 PRGRMG DEV EVAL IMPLTBL SYS: CPT | Performed by: INTERNAL MEDICINE

## 2018-01-01 PROCEDURE — 93295 DEV INTERROG REMOTE 1/2/MLT: CPT | Performed by: INTERNAL MEDICINE

## 2018-01-01 RX ORDER — CARVEDILOL 25 MG/1
25 TABLET ORAL 2 TIMES DAILY
Qty: 60 TABLET | Refills: 6 | Status: SHIPPED | OUTPATIENT
Start: 2018-01-01 | End: 2018-01-01 | Stop reason: SDUPTHER

## 2018-01-01 RX ORDER — FUROSEMIDE 40 MG/1
40 TABLET ORAL 2 TIMES DAILY
Qty: 60 TABLET | Refills: 5 | Status: SHIPPED | OUTPATIENT
Start: 2018-01-01

## 2018-01-01 RX ORDER — METOLAZONE 2.5 MG/1
2.5 TABLET ORAL DAILY
Qty: 30 TABLET | Refills: 5 | Status: SHIPPED | OUTPATIENT
Start: 2018-01-01

## 2018-01-01 RX ORDER — CARVEDILOL 25 MG/1
25 TABLET ORAL 2 TIMES DAILY
Qty: 60 TABLET | Refills: 6 | Status: SHIPPED | OUTPATIENT
Start: 2018-01-01

## 2018-01-01 RX ORDER — SILDENAFIL 50 MG/1
50 TABLET, FILM COATED ORAL DAILY PRN
Qty: 20 TABLET | Refills: 3 | Status: SHIPPED | OUTPATIENT
Start: 2018-01-01 | End: 2018-01-01

## 2018-02-13 NOTE — TELEPHONE ENCOUNTER
MD Jossie Iyer, RN                     Decrease lasix to daily BMP one week later              Patient notified and aware. Order for BMP successfully faxed to [facility] at [phone].

## 2018-02-14 NOTE — PROGRESS NOTES
Vikas R Castalia  1970  [phone]      02/14/2018    [facility] CARDIOLOGY     Mandie Gandhi MD  [address]  [facility] [address]    Chief Complaint   Patient presents with   • Atrial Fibrillation   • Cardiomyopathy         Problem List:       Atrial Fibrillation          a. Onset at age 32, initiated on flecainide and Coumadin therapy.   b. External cardioversion to normal sinus rhythm on 03/28/2007, maintaining normal sinus rhythm for approximately 2 weeks.   c. Left heart catheterization on 04/27/2007 with normal coronary arteries, EF 35%.   d. External cardioversion following TAMMIE showing no thrombus, 05/01/2007; maintaining normal sinus rhythm for approximately 2 months.  e. Echocardiogram on 04/15/2008 with mild TR, MR, mild LVH, EF 45% to 50%.  f. MUGA on 04/15/2008: EF 58%.   g. TAMMIE, 04/13/2009, with no thrombus, EF 50%.   h. External cardioversion to normal sinus rhythm, 04/13/2009.  i. Tambocor changed to Rythmol, 04/13/2009.  j. Pulmonary vein isolation procedure, 07/07/2009.   k. Recurrent episode of atrial fibrillation with RVR, status post external cardioversion, August 2009.  l. Repeat CT scan of the chest, October 2009, no evidence of pulmonary vein stenosis.   m. TAMMIE/external cardioversion, 07/01/2011: EF 30% to 35%, moderate LAE at 5.5 cm, mild to moderate spontaneous contrast, external cardioversion to normal sinus rhythm with a 200 joule shock with ERAF within 1 minute.   n. Initiation of Tikosyn with spontaneous conversion to normal sinus rhythm, 07/11/2011.   o. Repeat pulmonary vein isolation procedure, 01/30/2012.   p. Intracardiac ultrasound of the pulmonary veins for dyspnea, 06/04/2012, showing no significant pulmonary vein stenosis.  q. Pulmonary vein redo isolation of right inferior pulmonary vein, ablation of right atrial and left atrial rotors, left atrial posterior wall, left atrial anterior wall, and right atrial flutter, 12/03/2013        Dilated Cardiomyopathy/CHF Class III      r. Left heart catheterization, 06/04/2012, with ejection fraction of 38%, trace to mild mitral regurgitation, global hypokinesis, normal coronary arteries with LVEDP of 34 torr.  s. Echocardiogram, 10/24/2012, with an EF of 45% to 50%, moderate to severe LAE, concentric LVH.  t. Echocardiogram, 10/24/2012, left ventricular ejection fraction 45% to 50%, mild LVH.  u. Echocardiogram, 12/23/2013, left ventricular ejection fraction of 35%, small pericardial effusion.   v. MUGA, April 2014, left ventricular ejection fraction of approximately 39%.   w. Implantation of the Optimizer cardiac stimulator, 02/16/2015.   x. Echocardiogram, 12/18/2014, with evidence of ischemic cardiomyopathy, EF 40% to 45%.  y. MUGA, May 2015, left ventricular ejection fraction of 37% to 38%  z. MUGA 9//11/17 LVEF 34%       Allergies  No Known Allergies    Current Medications    Current Outpatient Prescriptions:   •  apixaban (ELIQUIS) 5 MG tablet tablet, Take 1 tablet by mouth 2 (Two) Times a Day., Disp: 60 tablet, Rfl: 11  •  aspirin 81 MG chewable tablet, Chew 81 mg Every Evening., Disp: , Rfl:   •  carvedilol (COREG) 25 MG tablet, TAKE ONE TABLET BY MOUTH TWO TIMES A DAY, Disp: 60 tablet, Rfl: 6  •  furosemide (LASIX) 40 MG tablet, Take 1 tablet by mouth 2 (Two) Times a Day. (Patient taking differently: Take 40 mg by mouth Daily.), Disp: 60 tablet, Rfl: 5  •  lisinopril (PRINIVIL,ZESTRIL) 10 MG tablet, TAKE ONE TABLET BY MOUTH ONCE DAILY, Disp: 30 tablet, Rfl: 6  •  metOLazone (ZAROXOLYN) 2.5 MG tablet, TAKE ONE TABLET BY MOUTH ONCE DAILY (Patient taking differently: TAKE ONE TABLET BY MOUTH PRN), Disp: 30 tablet, Rfl: 5  •  spironolactone (ALDACTONE) 25 MG tablet, TAKE ONE TABLET BY MOUTH ONCE DAILY, Disp: 30 tablet, Rfl: 11    History of Present Illness   HPI    Pt presents for follow up of DCM/AF . Since the pt has seen us, pt denies any palpitations, + ZAMUDIO + LH, and dizziness with standing.  "Denies any hospitalizations, ER visits, ICD shocks, or TIA/CVA symptoms. Overall feels well. No side effects to medications. No change in symptoms since last seen. Did have recent increase in crea and lasix was lowered to daily two days ago. Has ED    ROS:  General:  + fatigue, No weight gain or loss  Cardiovascular:  Denies CP, PND, syncope, near syncope, edema or palpitations.  Pulmonary:  + ZAMUDIO, No cough, or wheezing    Vitals:    02/14/18 1510   BP: 92/60   BP Location: Left arm   Patient Position: Sitting   Pulse: 69   Weight: 135 kg (297 lb)   Height: 180.3 cm (71\")       PE:  General: NAD  Neck: no JVD, no carotid bruits, no TM  Heart RRR, NL S1, S2, S4 present, no rubs, murmurs  Lungs: CTA, no wheezes, rhonchi, or rales  Abd: soft, non-tender, NL BS  Ext: No musculoskeletal deformities, no edema, cyanosis, or clubbing  Psych: normal mood and affect    Diagnostic Data:  Procedures.    1. Paroxysmal atrial fibrillation    2. Chronic systolic congestive heart failure    3. Dilated cardiomyopathy    4. Essential hypertension        ICD interrogation: NL fxn, NL battery fxn, No VT or AF    Plan:  1) PAF s/p RFA of PVA: no recurrence  Continue present medications. ICD function normal.   2) DCM/CHF: will stop ACEI and add entresto once crea stabilizes  3) Anticoagulation  Continue NOAC  4) HTN  Wt loss, exercise, salt reduction    F/up in 6 months         "

## 2018-02-16 NOTE — TELEPHONE ENCOUNTER
Per Dr. Tamayo, patient to start Entresto 24/26 Q12 hours.  Stop Lisinopril and stop Spironolactone today.  Re-check BMP in one week.  Patient and wife aware.  Medication changes sent into Pharmacy.    Alicia

## 2018-02-16 NOTE — TELEPHONE ENCOUNTER
[facility] called.  Entresto 24/26mg BID needs a prior auth submission.  I called [employer] at [phone] pt ID#[id number].  I did a verbal auth request.  Per Franko, auth will be determined 24-72 hours from now, they will fax determination.  Ref#[id number].  I called pharmacy with ref number.      Alicia

## 2018-08-22 NOTE — PROGRESS NOTES
Vikas R Turney  1970    There is no work phone number on file.      08/22/2018    [facility] CARDIOLOGY     Gandhi, Mandie Osuna MD  [address]  [facility] [address]    Chief Complaint   Patient presents with   • Atrial Fibrillation   • Cardiomyopathy       Problem List:   Atrial Fibrillation          a. Onset at age 32, initiated on flecainide and Coumadin therapy.   b. External cardioversion to normal sinus rhythm on 03/28/2007, maintaining normal sinus rhythm for approximately 2 weeks.   c. Left heart catheterization on 04/27/2007 with normal coronary arteries, EF 35%.   d. External cardioversion following TAMMIE showing no thrombus, 05/01/2007; maintaining normal sinus rhythm for approximately 2 months.  e. Echocardiogram on 04/15/2008 with mild TR, MR, mild LVH, EF 45% to 50%.  f. MUGA on 04/15/2008: EF 58%.   g. TAMMIE, 04/13/2009, with no thrombus, EF 50%.   h. External cardioversion to normal sinus rhythm, 04/13/2009.  i. Tambocor changed to Rythmol, 04/13/2009.  j. Pulmonary vein isolation procedure, 07/07/2009.   k. Recurrent episode of atrial fibrillation with RVR, status post external cardioversion, August 2009.  l. Repeat CT scan of the chest, October 2009, no evidence of pulmonary vein stenosis.   m. TAMMIE/external cardioversion, 07/01/2011: EF 30% to 35%, moderate LAE at 5.5 cm, mild to moderate spontaneous contrast, external cardioversion to normal sinus rhythm with a 200 joule shock with ERAF within 1 minute.   n. Initiation of Tikosyn with spontaneous conversion to normal sinus rhythm, 07/11/2011.   o. Repeat pulmonary vein isolation procedure, 01/30/2012.   p. Intracardiac ultrasound of the pulmonary veins for dyspnea, 06/04/2012, showing no significant pulmonary vein stenosis.  q. Pulmonary vein redo isolation of right inferior pulmonary vein, ablation of right atrial and left atrial rotors, left atrial posterior wall, left atrial anterior wall, and right atrial  flutter, 12/03/2013       Dilated Cardiomyopathy/CHF Class III      r. Left heart catheterization, 06/04/2012, with ejection fraction of 38%, trace to mild mitral regurgitation, global hypokinesis, normal coronary arteries with LVEDP of 34 torr.  s. Echocardiogram, 10/24/2012, with an EF of 45% to 50%, moderate to severe LAE, concentric LVH.  t. Echocardiogram, 10/24/2012, left ventricular ejection fraction 45% to 50%, mild LVH.  u. Echocardiogram, 12/23/2013, left ventricular ejection fraction of 35%, small pericardial effusion.   v. MUGA, April 2014, left ventricular ejection fraction of approximately 39%.   w. Implantation of the Optimizer cardiac stimulator, 02/16/2015.   x. Echocardiogram, 12/18/2014, with evidence of ischemic cardiomyopathy, EF 40% to 45%.  y. MUGA, May 2015, left ventricular ejection fraction of 37% to 38%  z. MUGA 9//11/17 LVEF 34%       Allergies  Allergies   Allergen Reactions   • Viagra [Sildenafil] Other (See Comments)     headache       Current Medications    Current Outpatient Prescriptions:   •  apixaban (ELIQUIS) 5 MG tablet tablet, Take 1 tablet by mouth 2 (Two) Times a Day., Disp: 60 tablet, Rfl: 11  •  carvedilol (COREG) 25 MG tablet, Take 1 tablet by mouth 2 (Two) Times a Day., Disp: 60 tablet, Rfl: 6  •  furosemide (LASIX) 40 MG tablet, Take 1 tablet by mouth 2 (Two) Times a Day. (Patient taking differently: Take 40 mg by mouth Daily.), Disp: 60 tablet, Rfl: 5  •  metOLazone (ZAROXOLYN) 2.5 MG tablet, TAKE ONE TABLET BY MOUTH ONCE DAILY (Patient taking differently: TAKE ONE TABLET BY MOUTH PRN), Disp: 30 tablet, Rfl: 5  •  sacubitril-valsartan (ENTRESTO) 24-26 MG tablet, Take 1 tablet by mouth Every 12 (Twelve) Hours., Disp: 60 tablet, Rfl: 6    History of Present Illness   HPI    Pt presents for follow up of AF/DCM/CHF. Since the pt has seen us, pt denies SOB, CP, + LH, dizziness with bending. Denies any hospitalizations, ER visits, ICD shocks, or TIA/CVA symptoms. Overall  "feels well. Feels PVC's daily over past month. BP at home in 90 - 100 mmHg. Walks daily.     ROS:  General: + fatigue, No weight gain or loss  Cardiovascular:  Denies CP, PND, syncope, near syncope, edema or palpitations.  Pulmonary:  Denies ZAMUDIO, cough, or wheezing    Vitals:    08/22/18 1624   BP: 90/60   BP Location: Left arm   Patient Position: Sitting   Pulse: 96   SpO2: 98%   Weight: 127 kg (280 lb)   Height: 177.8 cm (70\")       PE:  General: NAD  Neck: no JVD, no carotid bruits, no TM  Heart RRR, NL S1, S2, S4 present, no rubs, murmurs  Lungs: CTA, no wheezes, rhonchi, or rales  Abd: soft, non-tender, NL BS  Ext: No musculoskeletal deformities, no edema, cyanosis, or clubbing  Psych: normal mood and affect    Diagnostic Data:  Procedures.    1. Paroxysmal atrial fibrillation (CMS/HCC)    2. Chronic systolic heart failure (CMS/HCC)    3. Dilated cardiomyopathy (CMS/HCC)        ICD interrogation: NL fxn of SC ICD, No VT, No AF    CCS stimulator: NL fxn, 10% PVC's    Plan:  1) PAF s/p RFA of PVA: no recurrence  Continue present medications. ICD function normal.   2) DCM/CHF: Class I symptoms on meds: Echo next visit  3) Anticoagulation  Continue NOAC  4) HTN  Wt loss, exercise, salt reduction    F/up in 6 months         "

## 2019-01-01 ENCOUNTER — TELEPHONE (OUTPATIENT)
Dept: CARDIOLOGY | Facility: CLINIC | Age: 49
End: 2019-01-01

## 2019-01-01 ENCOUNTER — CLINICAL SUPPORT NO REQUIREMENTS (OUTPATIENT)
Dept: CARDIOLOGY | Facility: CLINIC | Age: 49
End: 2019-01-01

## 2019-01-01 DIAGNOSIS — I42.0 DILATED CARDIOMYOPATHY (HCC): ICD-10-CM

## 2019-01-01 PROCEDURE — 93295 DEV INTERROG REMOTE 1/2/MLT: CPT | Performed by: INTERNAL MEDICINE

## 2019-01-01 PROCEDURE — 93296 REM INTERROG EVL PM/IDS: CPT | Performed by: INTERNAL MEDICINE
